# Patient Record
Sex: FEMALE | NOT HISPANIC OR LATINO | Employment: UNEMPLOYED | URBAN - METROPOLITAN AREA
[De-identification: names, ages, dates, MRNs, and addresses within clinical notes are randomized per-mention and may not be internally consistent; named-entity substitution may affect disease eponyms.]

---

## 2023-08-09 ENCOUNTER — HOSPITAL ENCOUNTER (EMERGENCY)
Facility: CLINIC | Age: 48
Discharge: HOME | End: 2023-08-09
Attending: EMERGENCY MEDICINE | Admitting: EMERGENCY MEDICINE

## 2023-08-09 ENCOUNTER — APPOINTMENT (OUTPATIENT)
Dept: CT IMAGING | Facility: CLINIC | Age: 48
End: 2023-08-09
Attending: EMERGENCY MEDICINE

## 2023-08-09 VITALS
RESPIRATION RATE: 16 BRPM | DIASTOLIC BLOOD PRESSURE: 61 MMHG | OXYGEN SATURATION: 100 % | HEART RATE: 65 BPM | SYSTOLIC BLOOD PRESSURE: 130 MMHG | WEIGHT: 157.41 LBS | TEMPERATURE: 97.2 F

## 2023-08-09 DIAGNOSIS — R51.9 RIGHT SIDED FACIAL PAIN: ICD-10-CM

## 2023-08-09 DIAGNOSIS — M26.629 TMJ SYNDROME: ICD-10-CM

## 2023-08-09 LAB
ANION GAP SERPL CALCULATED.3IONS-SCNC: 10 MMOL/L (ref 7–15)
BASOPHILS # BLD AUTO: 0 10E3/UL (ref 0–0.2)
BASOPHILS NFR BLD AUTO: 0 %
BUN SERPL-MCNC: 7.9 MG/DL (ref 6–20)
CALCIUM SERPL-MCNC: 9.7 MG/DL (ref 8.6–10)
CHLORIDE SERPL-SCNC: 102 MMOL/L (ref 98–107)
CREAT SERPL-MCNC: 0.5 MG/DL (ref 0.51–0.95)
CRP SERPL-MCNC: 4.8 MG/L
DEPRECATED HCO3 PLAS-SCNC: 27 MMOL/L (ref 22–29)
EOSINOPHIL # BLD AUTO: 0.1 10E3/UL (ref 0–0.7)
EOSINOPHIL NFR BLD AUTO: 1 %
ERYTHROCYTE [DISTWIDTH] IN BLOOD BY AUTOMATED COUNT: 12 % (ref 10–15)
GFR SERPL CREATININE-BSD FRML MDRD: >90 ML/MIN/1.73M2
GLUCOSE SERPL-MCNC: 101 MG/DL (ref 70–99)
HCT VFR BLD AUTO: 42.1 % (ref 35–47)
HGB BLD-MCNC: 14.3 G/DL (ref 11.7–15.7)
HOLD SPECIMEN: NORMAL
HOLD SPECIMEN: NORMAL
IMM GRANULOCYTES # BLD: 0 10E3/UL
IMM GRANULOCYTES NFR BLD: 0 %
LYMPHOCYTES # BLD AUTO: 3.6 10E3/UL (ref 0.8–5.3)
LYMPHOCYTES NFR BLD AUTO: 44 %
MCH RBC QN AUTO: 30.4 PG (ref 26.5–33)
MCHC RBC AUTO-ENTMCNC: 34 G/DL (ref 31.5–36.5)
MCV RBC AUTO: 90 FL (ref 78–100)
MONOCYTES # BLD AUTO: 0.4 10E3/UL (ref 0–1.3)
MONOCYTES NFR BLD AUTO: 5 %
NEUTROPHILS # BLD AUTO: 4 10E3/UL (ref 1.6–8.3)
NEUTROPHILS NFR BLD AUTO: 50 %
NRBC # BLD AUTO: 0 10E3/UL
NRBC BLD AUTO-RTO: 0 /100
PLATELET # BLD AUTO: 288 10E3/UL (ref 150–450)
POTASSIUM SERPL-SCNC: 3.7 MMOL/L (ref 3.4–5.3)
RBC # BLD AUTO: 4.7 10E6/UL (ref 3.8–5.2)
SODIUM SERPL-SCNC: 139 MMOL/L (ref 136–145)
WBC # BLD AUTO: 8.1 10E3/UL (ref 4–11)

## 2023-08-09 PROCEDURE — 96361 HYDRATE IV INFUSION ADD-ON: CPT

## 2023-08-09 PROCEDURE — 80048 BASIC METABOLIC PNL TOTAL CA: CPT | Performed by: EMERGENCY MEDICINE

## 2023-08-09 PROCEDURE — 250N000011 HC RX IP 250 OP 636: Mod: JZ | Performed by: EMERGENCY MEDICINE

## 2023-08-09 PROCEDURE — 86140 C-REACTIVE PROTEIN: CPT | Performed by: EMERGENCY MEDICINE

## 2023-08-09 PROCEDURE — 258N000003 HC RX IP 258 OP 636: Performed by: EMERGENCY MEDICINE

## 2023-08-09 PROCEDURE — 250N000011 HC RX IP 250 OP 636: Performed by: EMERGENCY MEDICINE

## 2023-08-09 PROCEDURE — 36415 COLL VENOUS BLD VENIPUNCTURE: CPT | Performed by: EMERGENCY MEDICINE

## 2023-08-09 PROCEDURE — 96374 THER/PROPH/DIAG INJ IV PUSH: CPT | Mod: 59

## 2023-08-09 PROCEDURE — 70481 CT ORBIT/EAR/FOSSA W/DYE: CPT

## 2023-08-09 PROCEDURE — 85014 HEMATOCRIT: CPT | Performed by: EMERGENCY MEDICINE

## 2023-08-09 PROCEDURE — 99285 EMERGENCY DEPT VISIT HI MDM: CPT | Mod: 25

## 2023-08-09 RX ORDER — NAPROXEN 500 MG/1
500 TABLET ORAL 2 TIMES DAILY PRN
Qty: 24 TABLET | Refills: 0 | Status: SHIPPED | OUTPATIENT
Start: 2023-08-09 | End: 2023-08-17

## 2023-08-09 RX ORDER — CYCLOBENZAPRINE HCL 10 MG
10 TABLET ORAL 3 TIMES DAILY PRN
Qty: 12 TABLET | Refills: 0 | Status: SHIPPED | OUTPATIENT
Start: 2023-08-09

## 2023-08-09 RX ORDER — IOPAMIDOL 755 MG/ML
500 INJECTION, SOLUTION INTRAVASCULAR ONCE
Status: COMPLETED | OUTPATIENT
Start: 2023-08-09 | End: 2023-08-09

## 2023-08-09 RX ORDER — KETOROLAC TROMETHAMINE 15 MG/ML
15 INJECTION, SOLUTION INTRAMUSCULAR; INTRAVENOUS ONCE
Status: COMPLETED | OUTPATIENT
Start: 2023-08-09 | End: 2023-08-09

## 2023-08-09 RX ADMIN — SODIUM CHLORIDE 100 ML: 9 INJECTION, SOLUTION INTRAVENOUS at 17:05

## 2023-08-09 RX ADMIN — IOPAMIDOL 75 ML: 755 INJECTION, SOLUTION INTRAVENOUS at 17:04

## 2023-08-09 RX ADMIN — SODIUM CHLORIDE 1000 ML: 9 INJECTION, SOLUTION INTRAVENOUS at 16:09

## 2023-08-09 RX ADMIN — KETOROLAC TROMETHAMINE 15 MG: 15 INJECTION, SOLUTION INTRAMUSCULAR; INTRAVENOUS at 16:09

## 2023-08-09 ASSESSMENT — ACTIVITIES OF DAILY LIVING (ADL)
ADLS_ACUITY_SCORE: 33
ADLS_ACUITY_SCORE: 35
ADLS_ACUITY_SCORE: 35

## 2023-08-09 NOTE — ED TRIAGE NOTES
Pt American Sign Language. Pt here with c/o R sided facial/ear pain. Pt noticed the lump 9 years ago. Small raised lump above ear, spouse reports itching and yellow odorous discharge that is new. Pain goes down right side of neck. ABC in tact. A/Ox4

## 2023-08-09 NOTE — ED PROVIDER NOTES
History     Chief Complaint:  Ear Pain      used: professional .      Elizabeth Guajardo is a 48 year old female who presents for evaluation of ear pain. The patient reports having pain and itching in her right ear that started at 1000 today. States that she had an ear infection one month ago and that this feels similar. Adds that a few days ago while playing, she got hit in the face with a pillow and has had right sided facial pain, neck pain, and periorbital throbbing since. She took Tylenol without relief. Denies vision changes, fever, drainage of ear, runny nose, and sore throat. She does note that the pain in her face is worse with chewing.      Independent Historian:    None    Review of External Notes:  Reviewed [facility] ER note from 4/13/2023 when patient was seen for similar complaints.  They diagnosed her with an ear infection and treated her with antibiotics.    Medications:    The patient is not currently taking any prescribed medications.    Past Medical History:    Ear infection     Physical Exam   Patient Vitals for the past 24 hrs:   BP Temp Temp src Pulse Resp SpO2 Weight   08/09/23 1903 130/61 -- -- 65 16 -- --   08/09/23 1205 (!) 146/78 97.2  F (36.2  C) Temporal 79 18 100 % 71.4 kg (157 lb 6.5 oz)      Physical Exam  Vitals and nursing note reviewed.   Constitutional:       General: She is not in acute distress.     Appearance: She is not ill-appearing.      Comments: Hearing impaired   HENT:      Head: Normocephalic and atraumatic.      Jaw: Tenderness (Around the TMJ joint) present.      Right Ear: Tympanic membrane normal. Tenderness present. No drainage. There is mastoid tenderness (with mild swelling and erythema).      Left Ear: External ear normal.      Nose: Nose normal.   Eyes:      Extraocular Movements: Extraocular movements intact.      Conjunctiva/sclera: Conjunctivae normal.   Pulmonary:      Effort: Pulmonary effort is normal. No respiratory  distress.   Musculoskeletal:         General: No deformity or signs of injury.      Cervical back: Normal range of motion and neck supple. No tenderness.   Skin:     Coloration: Skin is not pale.      Findings: No rash.   Neurological:      Mental Status: She is alert and oriented to person, place, and time.      Cranial Nerves: No cranial nerve deficit.      Sensory: No sensory deficit.      Motor: No weakness.   Psychiatric:         Behavior: Behavior normal.           Emergency Department Course   Imaging:  CT Temporal Bones w Contrast   Final Result   IMPRESSION:   1.  Normal temporal bone CT.           Report per radiology    Laboratory:  Labs Ordered and Resulted from Time of ED Arrival to Time of ED Departure   BASIC METABOLIC PANEL - Abnormal       Result Value    Sodium 139      Potassium 3.7      Chloride 102      Carbon Dioxide (CO2) 27      Anion Gap 10      Urea Nitrogen 7.9      Creatinine 0.50 (*)     Calcium 9.7      Glucose 101 (*)     GFR Estimate >90     CRP INFLAMMATION - Normal    CRP Inflammation 4.80     CBC WITH PLATELETS AND DIFFERENTIAL    WBC Count 8.1      RBC Count 4.70      Hemoglobin 14.3      Hematocrit 42.1      MCV 90      MCH 30.4      MCHC 34.0      RDW 12.0      Platelet Count 288      % Neutrophils 50      % Lymphocytes 44      % Monocytes 5      % Eosinophils 1      % Basophils 0      % Immature Granulocytes 0      NRBCs per 100 WBC 0      Absolute Neutrophils 4.0      Absolute Lymphocytes 3.6      Absolute Monocytes 0.4      Absolute Eosinophils 0.1      Absolute Basophils 0.0      Absolute Immature Granulocytes 0.0      Absolute NRBCs 0.0        Emergency Department Course & Assessments:     Interventions:  Medications   ketorolac (TORADOL) injection 15 mg (15 mg Intravenous $Given 8/9/23 160)   0.9% sodium chloride BOLUS (0 mLs Intravenous Stopped 8/9/23 1903)   0.9% sodium chloride BOLUS (0 mLs Intravenous Stopped 8/9/23 1903)   iopamidol (ISOVUE-370) solution 500 mL (75  mLs Intravenous $Given 23 1701)      Assessments:  1536 I obtained history and examined the patient as noted above.     Independent Interpretation (X-rays, CTs, rhythm strip):  None    Consultations/Discussion of Management or Tests:  None       Social Determinants of Health affecting care:  None     Disposition:  The patient was discharged to home.     Impression & Plan    Medical Decision Makin-year-old female presenting with right ear and facial pain.  Initially she seemed to have some swelling and erythema around the right mastoid and had some pain with manipulation of the ear.  However there is no signs of otitis media or externa on exam.  There are some concern for mastoiditis based on her exam.  Therefore labs and a CT of the temporal bones was performed and did not show any signs of underlying infection or abscess.  On further evaluation and further discussion with the patient, she notes that the pain seems to be worse with chewing.  I suspect that she may have an underlying TMJ syndrome.  Her labs are otherwise reassuring.  I will prescribe naproxen and Flexeril for her symptoms and I recommend that she follow-up closely with primary care.  We discussed return precautions.      Diagnosis:    ICD-10-CM    1. Right sided facial pain  R51.9       2. TMJ syndrome  M26.629            Discharge Medications:  Discharge Medication List as of 2023  7:10 PM        START taking these medications    Details   cyclobenzaprine (FLEXERIL) 10 MG tablet Take 1 tablet (10 mg) by mouth 3 times daily as needed for muscle spasms, Disp-12 tablet, R-0, E-Prescribe      naproxen (NAPROSYN) 500 MG tablet Take 1 tablet (500 mg) by mouth 2 times daily as needed for moderate pain, Disp-24 tablet, R-0, E-Prescribe            Scribe Disclosure:  Sandra GRACE, am serving as a scribe at 4:30 PM on 2023 to document services personally performed by London An MD based on my observations and the provider's  statements to me.    I, Lucero Lucie, am serving as a scribe  at 4:45 PM on 8/9/2023 to document services personally performed by London An MD based on my observations and the provider's statements to me.    8/9/2023   London An MD Goodwin, Shaun M, MD  08/09/23 2033

## 2023-08-10 NOTE — PROGRESS NOTES
08/09/23 1939   Child Life   Location [facility] ED   Interaction Intent Introduction of Services;Initial Assessment   Method in-person   Individuals Present Siblings/Child Family Members;Patient;Caregiver/Adult Family Member   Comments (names or other info) Mother (and father?) using ASL video .   Intervention Goal Assess pt's school-aged child, answer questions, normalize environment   Intervention Developmental Play;Sibling/Child Family Member Support   Developmental Play Comment Normalizing and friendly conversation, answer questions, provided stickers   Patient Communication Strategies ASL video    Distress low distress   Distress Indicators staff observation   Outcomes Comment Pt's daughter (Semina sp?) appeared to be coping well, engaged with this writer easily, talked casually about medical equipment and the length of everyones hair.  Pt accepted stickers happily.   Time Spent   Direct Patient Care 10   Indirect Patient Care 10   Total Time Spent (Calc) 20

## (undated) RX ORDER — KETOROLAC TROMETHAMINE 15 MG/ML
INJECTION, SOLUTION INTRAMUSCULAR; INTRAVENOUS
Status: DISPENSED
Start: 2023-08-09